# Patient Record
Sex: FEMALE | Race: WHITE | URBAN - METROPOLITAN AREA
[De-identification: names, ages, dates, MRNs, and addresses within clinical notes are randomized per-mention and may not be internally consistent; named-entity substitution may affect disease eponyms.]

---

## 2019-08-06 ENCOUNTER — OFFICE VISIT CONVERTED (OUTPATIENT)
Dept: UROLOGY | Facility: CLINIC | Age: 64
End: 2019-08-06
Attending: NURSE PRACTITIONER

## 2019-08-06 ENCOUNTER — CONVERSION ENCOUNTER (OUTPATIENT)
Dept: UROLOGY | Facility: CLINIC | Age: 64
End: 2019-08-06

## 2019-09-06 ENCOUNTER — OFFICE VISIT CONVERTED (OUTPATIENT)
Dept: UROLOGY | Facility: CLINIC | Age: 64
End: 2019-09-06
Attending: NURSE PRACTITIONER

## 2019-10-18 ENCOUNTER — CONVERSION ENCOUNTER (OUTPATIENT)
Dept: UROLOGY | Facility: CLINIC | Age: 64
End: 2019-10-18

## 2019-10-18 ENCOUNTER — OFFICE VISIT CONVERTED (OUTPATIENT)
Dept: UROLOGY | Facility: CLINIC | Age: 64
End: 2019-10-18
Attending: NURSE PRACTITIONER

## 2019-11-15 ENCOUNTER — OFFICE VISIT CONVERTED (OUTPATIENT)
Dept: UROLOGY | Facility: CLINIC | Age: 64
End: 2019-11-15
Attending: UROLOGY

## 2021-05-15 VITALS
HEART RATE: 85 BPM | BODY MASS INDEX: 34.96 KG/M2 | SYSTOLIC BLOOD PRESSURE: 130 MMHG | DIASTOLIC BLOOD PRESSURE: 78 MMHG | TEMPERATURE: 98.3 F | WEIGHT: 190 LBS | HEIGHT: 62 IN

## 2021-05-15 VITALS
HEART RATE: 77 BPM | SYSTOLIC BLOOD PRESSURE: 174 MMHG | HEIGHT: 62 IN | BODY MASS INDEX: 34.96 KG/M2 | TEMPERATURE: 97.9 F | DIASTOLIC BLOOD PRESSURE: 89 MMHG | WEIGHT: 190 LBS

## 2021-05-15 VITALS
DIASTOLIC BLOOD PRESSURE: 80 MMHG | BODY MASS INDEX: 34.96 KG/M2 | TEMPERATURE: 97.6 F | SYSTOLIC BLOOD PRESSURE: 148 MMHG | HEIGHT: 62 IN | HEART RATE: 105 BPM | WEIGHT: 190 LBS

## 2021-05-15 VITALS — HEIGHT: 62 IN | BODY MASS INDEX: 34.96 KG/M2 | RESPIRATION RATE: 78 BRPM | TEMPERATURE: 98.7 F | WEIGHT: 190 LBS

## 2024-02-09 ENCOUNTER — HOSPITAL ENCOUNTER (OUTPATIENT)
Dept: OTHER | Facility: HOSPITAL | Age: 69
Discharge: HOME | End: 2024-02-09

## 2024-02-10 ENCOUNTER — HOSPITAL ENCOUNTER (OUTPATIENT)
Dept: OTHER | Facility: HOSPITAL | Age: 69
Discharge: HOME | End: 2024-02-10

## 2024-02-14 ENCOUNTER — HOSPITAL ENCOUNTER (OUTPATIENT)
Dept: OTHER | Facility: HOSPITAL | Age: 69
Discharge: HOME | End: 2024-02-14

## 2024-12-12 ENCOUNTER — OFFICE VISIT (OUTPATIENT)
Dept: NEUROLOGY | Facility: CLINIC | Age: 69
End: 2024-12-12
Payer: MEDICARE

## 2024-12-12 VITALS
WEIGHT: 113 LBS | HEIGHT: 61 IN | DIASTOLIC BLOOD PRESSURE: 79 MMHG | HEART RATE: 97 BPM | SYSTOLIC BLOOD PRESSURE: 124 MMHG | BODY MASS INDEX: 21.34 KG/M2

## 2024-12-12 DIAGNOSIS — I48.0 PAROXYSMAL ATRIAL FIBRILLATION: ICD-10-CM

## 2024-12-12 DIAGNOSIS — G40.109: ICD-10-CM

## 2024-12-12 DIAGNOSIS — I69.30 SEQUELAE, POST-STROKE: Primary | ICD-10-CM

## 2024-12-12 RX ORDER — GABAPENTIN 100 MG/1
100 CAPSULE ORAL 3 TIMES DAILY
COMMUNITY

## 2024-12-12 RX ORDER — TRAZODONE HYDROCHLORIDE 50 MG/1
50 TABLET, FILM COATED ORAL EVERY 24 HOURS
COMMUNITY

## 2024-12-12 RX ORDER — ATORVASTATIN CALCIUM 80 MG/1
80 TABLET, FILM COATED ORAL DAILY
COMMUNITY
Start: 2024-09-26

## 2024-12-12 RX ORDER — ROSUVASTATIN CALCIUM 20 MG/1
20 TABLET, COATED ORAL DAILY
COMMUNITY

## 2024-12-12 RX ORDER — LACTULOSE 10 G/15ML
20 SOLUTION ORAL NIGHTLY
COMMUNITY
Start: 2024-12-06 | End: 2025-01-05

## 2024-12-12 RX ORDER — VALSARTAN 320 MG/1
1 TABLET ORAL DAILY
COMMUNITY

## 2024-12-12 RX ORDER — AMLODIPINE BESYLATE 10 MG/1
1 TABLET ORAL DAILY
COMMUNITY

## 2024-12-12 RX ORDER — FAMOTIDINE 40 MG/1
40 TABLET, FILM COATED ORAL DAILY
COMMUNITY
Start: 2024-06-24

## 2024-12-12 RX ORDER — VENLAFAXINE HYDROCHLORIDE 75 MG/1
75 CAPSULE, EXTENDED RELEASE ORAL 2 TIMES DAILY
COMMUNITY
Start: 2024-07-11

## 2024-12-12 RX ORDER — DICYCLOMINE HYDROCHLORIDE 10 MG/1
10 CAPSULE ORAL AS NEEDED
COMMUNITY
Start: 2024-11-07

## 2024-12-12 RX ORDER — INSULIN LISPRO 100 [IU]/ML
100 INJECTION, SUSPENSION SUBCUTANEOUS TAKE AS DIRECTED
COMMUNITY

## 2024-12-12 RX ORDER — POTASSIUM CHLORIDE 1.5 G/1.58G
20 POWDER, FOR SOLUTION ORAL EVERY 24 HOURS
COMMUNITY

## 2024-12-12 RX ORDER — METFORMIN HYDROCHLORIDE EXTENDED-RELEASE TABLETS 500 MG/1
500 TABLET, FILM COATED, EXTENDED RELEASE ORAL
COMMUNITY

## 2024-12-12 RX ORDER — BUPROPION HYDROCHLORIDE 150 MG/1
150 TABLET ORAL DAILY
COMMUNITY
Start: 2024-07-11

## 2024-12-12 RX ORDER — LISINOPRIL AND HYDROCHLOROTHIAZIDE 20; 25 MG/1; MG/1
1 TABLET ORAL DAILY
COMMUNITY

## 2024-12-12 RX ORDER — BUTALBITAL, ACETAMINOPHEN, CAFFEINE AND CODEINE PHOSPHATE 300; 50; 40; 30 MG/1; MG/1; MG/1; MG/1
1 CAPSULE ORAL TAKE AS DIRECTED
COMMUNITY

## 2024-12-12 RX ORDER — LEVETIRACETAM 500 MG/1
TABLET ORAL
Qty: 180 TABLET | Refills: 3 | Status: SHIPPED | OUTPATIENT
Start: 2024-12-12

## 2024-12-12 RX ORDER — GLIPIZIDE 5 MG/1
5 TABLET, FILM COATED, EXTENDED RELEASE ORAL DAILY
COMMUNITY

## 2024-12-12 NOTE — ASSESSMENT & PLAN NOTE
I discussed with them that she is on Eliquis for an undetermined reason and she never followed up with cardiology but they are looking for atrial fibrillation as the source for the stroke.  She is to be taking Eliquis and I will refer her to cardiology in [place] to place a loop monitor as well as to evaluate her for her history of angina according to cardiology notes.

## 2024-12-12 NOTE — PROGRESS NOTES
Chief Complaint  Stroke (Feb 2024 in [place], Images available.)    Subjective          Matilde Crawford is a 69 y.o. female who presents to [facility]OLOG[facility]GERY  History of Present Illness  69-year-old woman evaluated for stroke.  The history is obtained from the patient as well as review of the chart.  The review of the chart obtained from a progress note yesterday from a cardiologist in [place].  I do not know if this is a telehealth visit however is documented in the chart that the chief complaint was that she had treatment evaluation, abdominal aortic aneurysm, patient no-show for PET on 7/22/2024, patient no-show for her loop implant on 7/17/2024 and please do alcohol screening on 8/22/2024.  It is noted also by that cardiologist that he will consider ORTIZ after next follow-up visit.  Patient does not want it at this time.    It is noted that on February 9, 2024 the patient was admitted for headache and left-sided paresthesias and difficulty following commands.  Stroke alert protocol was started.  CTA showed abrupt occlusion of the distal right posterior cerebral artery.  CTA of the neck showed soft scattered plaque in the left carotid artery narrowing at 50% and milder plaque seen distally in the bilateral common carotid artery.  CT perfusion showed abnormality in the right occipital lobe.  After the stroke alert she had a drop of her vision in the left superior quadrant and was started on IV heparin.  The next morning the patient was more numb on the left side, diminished sensation in the face and some trouble in controlling left arm so a second stroke alert was called.  On 2/10/2024 CT of the head showed evolving right occipital lobe infarct.  Ultimately she had extended her stroke and has left vision problems.  On 2/9/2024 MRI of the brain showed acute infarct in the right occipital and right basal ganglia.  Repeat MRI the following day showed subacute infarct in the right  occipital lobe was much larger and now extends to involve the posterior/medial portion of the right temporal lobe.  Subacute infarct of the right thalamus is slightly large/more conspicuous.  Slight leftward midline shift now evident.  Mild to moderate chronic white matter changes.  Heparin was stopped and the patient was transitioned to Eliquis.  On 2/14/2024 CT of the head showed large area of decreased attenuation involving the right temporal and occipital lobes as well as extending into the region of the right thalamus and posterior limb of the right internal capsule consistent with patient's known subacute infarct.  7 mm subfalcine herniation to the left.  Patient taking Eliquis and instructed to continue.    The patient has history of angina pectoris, hypertension, hyperlipidemia, diabetes mellitus, carotid stenosis bilaterally.    She is here today with her .  She states that she was in [place] and bought a house there last year and was helping her sister.  She and her  were not living together at that time.  She has 1 son living in [place] who is a single father.  Her  helps her with activities of daily living including dressing, toileting, bathing and ambulating.  She is unable to ambulate and has spasticity in her left leg with a left AFO.  She is in a wheelchair.  She has been undergoing physical therapy, Occupational Therapy.  They are under the impression that she is going to be able to ambulate independently again and independent with activities of daily living.    According to her  she had 5 episodes in which she was staring in space for about 2 minutes not associated with motor movements in the last month.  She was not responsive.    She is being treated for depression and is on several antidepressant medications.    I showed him the anatomy of the carotid and intracranial circulation as well as reviewed with him the MRI of the brain.    Objective   Vital Signs:   BP  "124/79   Pulse 97   Ht 154.9 cm (61\")   Wt 51.3 kg (113 lb)   BMI 21.35 kg/m²     Physical Exam   She is alert, fluent, phasic, follows commands well.  She has complete left homonymous hemianopsia.  EOMs full, facial strength is full.  She is able to lift her left arm and leg partially against gravity but she is unable to to hold a cup with her left hand without dropping it and has significant difficulty in seeing her left hand.  Fine finger movements are significantly impaired.  She is unable to get up from a sitting to standing position without her  helping her and carrying her.  She is able to take a small step with her  supporting her.  Reflexes are absent.  Heart is regular and rhythm normal in rate.        Assessment and Plan  Diagnoses and all orders for this visit:    1. Sequelae, post-stroke (Primary)  Assessment & Plan:  I discussed with them that she has permanent deficits and permanent brain damage.  She will not improve to live independently or walk independently.  They are under the impression that she will improve and that is the reason they are continuing to do physical therapy and Occupational Therapy.  I discussed with them that she will not get better to the point of being independent even with the most extensive physical and occupational therapy.  I will repeat an MRI of the brain as well as MRA of the carotids and brain and they are to call her office to find out results.  I will see her again in 6 months time for follow-up.  Thank you for letting me participate in her care.    Orders:  -     MRI Brain Without Contrast; Future  -     MRI Angiogram Head Without Contrast; Future  -     MRI Angiogram Neck Without Contrast; Future  -     Ambulatory Referral to Cardiology    2. Paroxysmal atrial fibrillation  Assessment & Plan:  I discussed with them that she is on Eliquis for an undetermined reason and she never followed up with cardiology but they are looking for atrial " fibrillation as the source for the stroke.  She is to be taking Eliquis and I will refer her to cardiology in [place] to place a loop monitor as well as to evaluate her for her history of angina according to cardiology notes.    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  I will start her on Keppra 500 mg twice a day.  I discussed with them that her effects of Keppra including mood swings and depression.  Depression should be monitored by her primary care which is presently taking bupropion, trazodone and Effexor.      Other orders  -     levETIRAcetam (KEPPRA) 500 MG tablet; 1 tablet twice a day  Dispense: 180 tablet; Refill: 3         Total time spent with the patient and coordinating patient care was 72 minutes.    Follow Up  No follow-ups on file.  Patient was given instructions and counseling regarding her condition or for health maintenance advice. Please see specific information pulled into the AVS if appropriate.

## 2024-12-12 NOTE — ASSESSMENT & PLAN NOTE
I discussed with them that she has permanent deficits and permanent brain damage.  She will not improve to live independently or walk independently.  They are under the impression that she will improve and that is the reason they are continuing to do physical therapy and Occupational Therapy.  I discussed with them that she will not get better to the point of being independent even with the most extensive physical and occupational therapy.  I will repeat an MRI of the brain as well as MRA of the carotids and brain and they are to call her office to find out results.  I will see her again in 6 months time for follow-up.  Thank you for letting me participate in her care.

## 2024-12-12 NOTE — ASSESSMENT & PLAN NOTE
I discussed with him that she could have focal onset behavioral arrest epileptic seizure secondary to her stroke.  I will start her on Keppra 500 mg twice a day.  I discussed with them that her effects of Keppra including mood swings and depression.  Depression should be monitored by her primary care which is presently taking bupropion, trazodone and Effexor.

## 2025-01-13 ENCOUNTER — HOSPITAL ENCOUNTER (OUTPATIENT)
Dept: MRI IMAGING | Facility: HOSPITAL | Age: 70
Discharge: HOME | End: 2025-01-13
Payer: MEDICARE

## 2025-01-13 DIAGNOSIS — I69.30 SEQUELAE, POST-STROKE: ICD-10-CM

## 2025-01-13 DIAGNOSIS — I48.0 PAROXYSMAL ATRIAL FIBRILLATION: ICD-10-CM

## 2025-01-13 PROCEDURE — 70547 MR ANGIOGRAPHY NECK W/O DYE: CPT

## 2025-01-13 PROCEDURE — 70544 MR ANGIOGRAPHY HEAD W/O DYE: CPT

## 2025-01-13 PROCEDURE — 70551 MRI BRAIN STEM W/O DYE: CPT

## 2025-01-14 ENCOUNTER — TELEPHONE (OUTPATIENT)
Dept: CARDIOLOGY | Facility: CLINIC | Age: 70
End: 2025-01-14

## 2025-01-14 ENCOUNTER — OFFICE VISIT (OUTPATIENT)
Dept: CARDIOLOGY | Facility: CLINIC | Age: 70
End: 2025-01-14
Payer: MEDICARE

## 2025-01-14 VITALS
SYSTOLIC BLOOD PRESSURE: 129 MMHG | HEART RATE: 96 BPM | BODY MASS INDEX: 20.24 KG/M2 | WEIGHT: 110 LBS | HEIGHT: 62 IN | DIASTOLIC BLOOD PRESSURE: 84 MMHG

## 2025-01-14 DIAGNOSIS — I63.9 CEREBROVASCULAR ACCIDENT (CVA), UNSPECIFIED MECHANISM: ICD-10-CM

## 2025-01-14 DIAGNOSIS — E78.2 MIXED HYPERLIPIDEMIA: ICD-10-CM

## 2025-01-14 DIAGNOSIS — I10 PRIMARY HYPERTENSION: Primary | ICD-10-CM

## 2025-01-14 NOTE — TELEPHONE ENCOUNTER
----- Message from Cori Jacob sent at 1/14/2025  1:17 PM EST -----    ----- Message -----  From: Artemio Salcedo MD  Sent: 1/14/2025  12:49 PM EST  To: REGINA Collado    Can we get somebody to call her pharmacy.  It is listed that she has both on lisinopril HCT and valsartan (should not be on both) and both Lipitor and Crestor (obviously should not be on both).  She gets her medications filled at [facility].  I just want to get an accurate list of what she is getting so we can get the other medications off of her list.

## 2025-01-14 NOTE — TELEPHONE ENCOUNTER
Called patient pharmacy. Pharmacy states lipitor is the only cholesterol medication listed.     Both Lisinopril HCT and Valsartan both were refilled by PCP on 01/08.     Attempted to call PCP office regarding medication clarification. Left message to have nurse call back with information.

## 2025-01-14 NOTE — PROGRESS NOTES
"Chief Complaint  Sequelae, post-stroke and Atrial Fibrillation    Subjective        Matilde Crawford presents to [facility] CARDIOLOGY  History of present illness:    Patient is a 69-year-old female with history of hypertension and hyperlipidemia.  She had a stroke back around 2/9/2024.  The CTA showed abrupt occlusion of the distal right PCA.  It showed at most 50% left common carotid artery stenosis.  She apparently had extension of the stroke with worsening symptoms while she was in the hospital and they put her on heparin and then transition her to Eliquis.  She saw a cardiologist at that time and refused ORTIZ and refused a loop recorder.  She is still refusing loop recorder as she states that \"she does not want to be a guinea pig.\"  She is very adamant about this.  She is taking the Eliquis with no bleeding problems.  She did see neurology who set her back up to see us.  She does not smoke or drink alcohol.  Mother has a history of A-fib.      Past Medical History:   Diagnosis Date    Breast cancer, stage 4     Diabetes mellitus, type 2     Hyperlipemia     Hypertension     Stroke 02/2024         Past Surgical History:   Procedure Laterality Date    BREAST SURGERY      Had breast cancer, had mass removed    CHOLECYSTECTOMY      REPLACEMENT TOTAL KNEE Right           Social History     Socioeconomic History    Marital status:    Tobacco Use    Smoking status: Never     Passive exposure: Never    Smokeless tobacco: Never   Vaping Use    Vaping status: Never Used   Substance and Sexual Activity    Alcohol use: Never    Drug use: Defer    Sexual activity: Defer         Family History   Family history unknown: Yes          Allergies   Allergen Reactions    Naproxen Anaphylaxis            Current Outpatient Medications:     amLODIPine (NORVASC) 10 MG tablet, Take 1 tablet by mouth Daily., Disp: , Rfl:     apixaban (Eliquis) 5 MG tablet tablet, Take 1 tablet by mouth 2 (Two) Times a Day., Disp: , " "Rfl:     atorvastatin (LIPITOR) 80 MG tablet, Take 1 tablet by mouth Daily., Disp: , Rfl:     buPROPion XL (WELLBUTRIN XL) 150 MG 24 hr tablet, Take 1 tablet by mouth Daily., Disp: , Rfl:     Butalbital-APAP-Caff-Cod -44-30 MG capsule, Take 1 tablet by mouth Take As Directed., Disp: , Rfl:     dicyclomine (BENTYL) 10 MG capsule, Take 1 capsule by mouth As Needed., Disp: , Rfl:     famotidine (PEPCID) 40 MG tablet, Take 1 tablet by mouth Daily., Disp: , Rfl:     gabapentin (NEURONTIN) 100 MG capsule, Take 1 capsule by mouth 3 (Three) Times a Day., Disp: , Rfl:     glipizide (GLUCOTROL XL) 5 MG ER tablet, Take 1 tablet by mouth Daily., Disp: , Rfl:     Insulin Lispro Prot & Lispro (HumaLOG Mix 50/50 KwikPen) (50-50) 100 UNIT/ML suspension pen-injector, 100 Units Take As Directed., Disp: , Rfl:     levETIRAcetam (KEPPRA) 500 MG tablet, 1 tablet twice a day, Disp: 180 tablet, Rfl: 3    linaclotide (LINZESS) 145 MCG capsule capsule, Take 1 capsule by mouth Every Morning Before Breakfast., Disp: , Rfl:     lisinopril-hydrochlorothiazide (PRINZIDE,ZESTORETIC) 20-25 MG per tablet, Take 1 tablet by mouth Daily., Disp: , Rfl:     metFORMIN (FORTAMET) 500 MG (OSM) 24 hr tablet, Take 1 tablet by mouth Daily With Breakfast., Disp: , Rfl:     potassium chloride (KLOR-CON) 20 MEQ packet, 20 mEq Daily., Disp: , Rfl:     rosuvastatin (CRESTOR) 20 MG tablet, Take 1 tablet by mouth Daily., Disp: , Rfl:     traZODone (DESYREL) 50 MG tablet, 1 tablet Daily., Disp: , Rfl:     valsartan (DIOVAN) 320 MG tablet, Take 1 tablet by mouth Daily., Disp: , Rfl:     venlafaxine XR (EFFEXOR-XR) 75 MG 24 hr capsule, Take 1 capsule by mouth 2 (Two) Times a Day., Disp: , Rfl:       ROS:  Cardiac review of systems negative.    Objective     /84   Pulse 96   Ht 157.5 cm (62\")   Wt 49.9 kg (110 lb)   BMI 20.12 kg/m²       General Appearance:   well developed  well nourished  HENT:   oropharynx moist  lips not cyanotic  Respiratory:  no " "respiratory distress  normal breath sounds  no rales  Cardiovascular:  no jugular venous distention  regular rhythm  S1 normal, S2 normal  no S3, no S4   no murmur  no rub, no thrill  No carotid bruit  pedal pulses normal  lower extremity edema: none    Musculoskeletal:  no clubbing of fingers.   normocephalic, head atraumatic  Skin:   warm, dry  Psychiatric:  judgement and insight appropriate  normal mood and affect    ECHO:    STRESS:    CATH:  No results found for this or any previous visit.    BMP:     BUN   Date Value Ref Range Status   07/02/2020 17 7 - 20 mg/dL Final     Creatinine   Date Value Ref Range Status   07/02/2020 0.6 (L) 0.7 - 1.5 mg/dL Final     Sodium   Date Value Ref Range Status   07/02/2020 134 (L) 137 - 145 mmol/L Final     Potassium   Date Value Ref Range Status   07/02/2020 3.9 3.5 - 5.1 mmol/L Final     Chloride   Date Value Ref Range Status   07/02/2020 101 98 - 107 mmol/L Final     Total CO2   Date Value Ref Range Status   07/02/2020 26 22 - 30 mmol/L Final     Calcium   Date Value Ref Range Status   07/02/2020 9.0 8.4 - 10.2 mg/dL Final     BUN/Creatinine Ratio   Date Value Ref Range Status   07/02/2020 28.3 RATIO Final     LIPIDS:  No results found for: \"CHOL\", \"TRIG\", \"HDL\", \"LDL\", \"VLDL\", \"LDLHDL\"      Procedures             ASSESSMENT:  Diagnoses and all orders for this visit:    1. Primary hypertension (Primary)    2. Mixed hyperlipidemia    3. Cerebrovascular accident (CVA), unspecified mechanism         PLAN:    1.  Patient is very adamant that she does not want undergo any procedures including ORTIZ or loop recorder.  She states that she does not want to be a \"guinea pig.\"  2.  Not exactly sure why she left on Eliquis.  Per the notes from back then she did have extension of her stroke and they put her on heparin for that and then transition this to Eliquis.  She apparently has been on Eliquis since at least February 2024.  She notes no bleeding problems.  There was no apparent " documentation of atrial fibrillation/flutter.  3.  Will call patient's pharmacy.  Per her notes she has both on lisinopril HCT and valsartan.  Also per the charge it has both Lipitor and Crestor ordered.  When I tried to get what is in our computer system correct.  She fills her medications at [facility] in [place].  4.  Patient does not smoke.  5.  Blood pressures under good control.      Return in about 2 months (around 3/14/2025) for carlos espraza.     Patient was given instructions and counseling regarding her condition or for health maintenance advice. Please see specific information pulled into the AVS if appropriate.         Artemio Salcedo MD   1/14/2025  12:45 EST

## 2025-01-15 NOTE — TELEPHONE ENCOUNTER
Stop lisinopril hctz. Monitor bp for two weeks and submit a log. Will likely restart just HCTZ and consider the addition of amlodipine.

## 2025-01-15 NOTE — TELEPHONE ENCOUNTER
PCP office called back to confirm the patient is prescribed Valsartan and Lisinopril HCT as well as lipitor.

## 2025-01-15 NOTE — TELEPHONE ENCOUNTER
ELIZABETH patient. Went over recommendations. Instructions to keep BP/HR log and drop off at office. Patient verbalized understanding and appreciation. If BP starts increasing greatly patient will call.

## 2025-01-15 NOTE — ADDENDUM NOTE
Addended by: ISABELLE ONTIVEROS on: 1/15/2025 04:13 PM     Modules accepted: Orders    
Statement Selected

## 2025-03-19 ENCOUNTER — OFFICE VISIT (OUTPATIENT)
Dept: CARDIOLOGY | Facility: CLINIC | Age: 70
End: 2025-03-19
Payer: MEDICARE

## 2025-03-19 VITALS
WEIGHT: 110 LBS | DIASTOLIC BLOOD PRESSURE: 72 MMHG | SYSTOLIC BLOOD PRESSURE: 121 MMHG | HEART RATE: 109 BPM | HEIGHT: 62 IN | OXYGEN SATURATION: 97 % | BODY MASS INDEX: 20.24 KG/M2

## 2025-03-19 DIAGNOSIS — E78.2 MIXED HYPERLIPIDEMIA: ICD-10-CM

## 2025-03-19 DIAGNOSIS — I10 PRIMARY HYPERTENSION: ICD-10-CM

## 2025-03-19 DIAGNOSIS — I63.9 CEREBROVASCULAR ACCIDENT (CVA), UNSPECIFIED MECHANISM: Primary | ICD-10-CM

## 2025-03-19 NOTE — PROGRESS NOTES
Chief Complaint  Atrial Fibrillation, Follow-up, and Hypertension    Subjective        History of Present Illness  Matilde Crawford presents to [facility] CARDIOLOGY for follow up.   Patient is a 69-year-old female with past medical history outlined below, significant for CVA, type 2 diabetes, hypertension and hyperlipidemia who presents for follow-up.  She is on anticoagulation after her stroke.  She notes no bleeding issues or concerns.  She denies any chest pain or discomfort, dyspnea, palpitations, edema or syncope.    Past Medical History:   Diagnosis Date    Breast cancer, stage 4     Diabetes mellitus, type 2     Hyperlipemia     Hypertension     Stroke 02/2024       ALLERGY  Allergies   Allergen Reactions    Naproxen Anaphylaxis        Past Surgical History:   Procedure Laterality Date    BREAST SURGERY      Had breast cancer, had mass removed    CHOLECYSTECTOMY      REPLACEMENT TOTAL KNEE Right         Social History     Socioeconomic History    Marital status:    Tobacco Use    Smoking status: Never     Passive exposure: Never    Smokeless tobacco: Never   Vaping Use    Vaping status: Never Used   Substance and Sexual Activity    Alcohol use: Never    Drug use: Defer    Sexual activity: Defer       Family History   Family history unknown: Yes        Current Outpatient Medications on File Prior to Visit   Medication Sig    amLODIPine (NORVASC) 10 MG tablet Take 1 tablet by mouth Daily.    atorvastatin (LIPITOR) 80 MG tablet Take 1 tablet by mouth Daily.    buPROPion XL (WELLBUTRIN XL) 150 MG 24 hr tablet Take 1 tablet by mouth Daily.    Butalbital-APAP-Caff-Cod -31-30 MG capsule Take 1 tablet by mouth Take As Directed.    dicyclomine (BENTYL) 10 MG capsule Take 1 capsule by mouth As Needed.    famotidine (PEPCID) 40 MG tablet Take 1 tablet by mouth Daily.    gabapentin (NEURONTIN) 100 MG capsule Take 1 capsule by mouth 3 (Three) Times a Day.    glipizide (GLUCOTROL XL) 5 MG ER  "tablet Take 1 tablet by mouth Daily.    Insulin Lispro Prot & Lispro (HumaLOG Mix 50/50 KwikPen) (50-50) 100 UNIT/ML suspension pen-injector 100 Units Take As Directed.    levETIRAcetam (KEPPRA) 500 MG tablet 1 tablet twice a day    linaclotide (LINZESS) 145 MCG capsule capsule Take 1 capsule by mouth Every Morning Before Breakfast.    metFORMIN (FORTAMET) 500 MG (OSM) 24 hr tablet Take 1 tablet by mouth Daily With Breakfast.    potassium chloride (KLOR-CON) 20 MEQ packet 20 mEq Daily.    traZODone (DESYREL) 50 MG tablet 1 tablet Daily.    valsartan (DIOVAN) 320 MG tablet Take 1 tablet by mouth Daily.    venlafaxine XR (EFFEXOR-XR) 75 MG 24 hr capsule Take 1 capsule by mouth 2 (Two) Times a Day.    [DISCONTINUED] apixaban (Eliquis) 5 MG tablet tablet Take 1 tablet by mouth 2 (Two) Times a Day.     No current facility-administered medications on file prior to visit.       Objective   Vitals:    03/19/25 0922   BP: 121/72   Pulse: 109   SpO2: 97%   Weight: 49.9 kg (110 lb)   Height: 157.5 cm (62.01\")       Physical Exam  Constitutional:       General: She is awake. She is not in acute distress.     Appearance: Normal appearance.   HENT:      Head: Normocephalic.      Nose: Nose normal. No congestion.   Eyes:      Extraocular Movements: Extraocular movements intact.      Conjunctiva/sclera: Conjunctivae normal.      Pupils: Pupils are equal, round, and reactive to light.   Neck:      Thyroid: No thyromegaly.      Vascular: No JVD.   Cardiovascular:      Rate and Rhythm: Normal rate and regular rhythm.      Chest Wall: PMI is not displaced.      Pulses: Normal pulses.      Heart sounds: Normal heart sounds, S1 normal and S2 normal. No murmur heard.     No friction rub. No gallop. No S3 or S4 sounds.   Pulmonary:      Effort: Pulmonary effort is normal.      Breath sounds: Normal breath sounds. No wheezing, rhonchi or rales.   Abdominal:      General: Bowel sounds are normal.      Palpations: Abdomen is soft.      " Tenderness: There is no abdominal tenderness.   Musculoskeletal:      Cervical back: No tenderness.      Right lower leg: No edema.      Left lower leg: No edema.   Lymphadenopathy:      Cervical: No cervical adenopathy.   Skin:     General: Skin is warm and dry.      Capillary Refill: Capillary refill takes less than 2 seconds.      Coloration: Skin is not cyanotic.      Findings: No petechiae or rash.      Nails: There is no clubbing.   Neurological:      Mental Status: She is alert.   Psychiatric:         Mood and Affect: Mood normal.         Behavior: Behavior is cooperative.           Result Review     The following data was reviewed by REGINA Ha on 03/19/25.                   No results found for this or any previous visit.          Procedures      Assessment & Plan  Cerebrovascular accident (CVA), unspecified mechanism  Status post recent CVA.  She was evidently started on anticoagulation after her CVA.  She has no known history of atrial fibrillation and refuses a loop recorder or ORTIZ.  She notes no bleeding issues on the anticoagulation.  Primary hypertension  Blood pressure is well-controlled.  Continue valsartan and amlodipine.  Mixed hyperlipidemia  Continue statin therapy.                   The medical services provided during this encounter are part of ongoing care related to this patient's single serious condition or complex condition.  Follow Up   Return in about 6 months (around 9/19/2025) for With Dr. Salcedo.    Patient was given instructions and counseling regarding her condition or for health maintenance advice. Please see specific information pulled into the AVS if appropriate.     REGINA Ha  03/19/25  09:38 EDT    Dictated Utilizing Dragon Dictation